# Patient Record
Sex: MALE | Race: BLACK OR AFRICAN AMERICAN
[De-identification: names, ages, dates, MRNs, and addresses within clinical notes are randomized per-mention and may not be internally consistent; named-entity substitution may affect disease eponyms.]

---

## 2022-07-01 ENCOUNTER — HOSPITAL ENCOUNTER (INPATIENT)
Dept: HOSPITAL 54 - ER | Age: 27
LOS: 1 days | Discharge: LEFT BEFORE DISCHARGE | DRG: 247 | End: 2022-07-02
Attending: FAMILY MEDICINE | Admitting: FAMILY MEDICINE
Payer: MEDICAID

## 2022-07-01 VITALS — SYSTOLIC BLOOD PRESSURE: 95 MMHG | DIASTOLIC BLOOD PRESSURE: 54 MMHG

## 2022-07-01 VITALS — BODY MASS INDEX: 22.89 KG/M2 | HEIGHT: 72 IN | WEIGHT: 169 LBS

## 2022-07-01 DIAGNOSIS — U07.1: ICD-10-CM

## 2022-07-01 DIAGNOSIS — K59.09: ICD-10-CM

## 2022-07-01 DIAGNOSIS — E87.1: ICD-10-CM

## 2022-07-01 DIAGNOSIS — E86.9: ICD-10-CM

## 2022-07-01 DIAGNOSIS — K56.609: Primary | ICD-10-CM

## 2022-07-01 DIAGNOSIS — R73.9: ICD-10-CM

## 2022-07-01 DIAGNOSIS — E87.6: ICD-10-CM

## 2022-07-01 LAB
ALBUMIN SERPL BCP-MCNC: 4.2 G/DL (ref 3.4–5)
ALP SERPL-CCNC: 100 U/L (ref 46–116)
ALT SERPL W P-5'-P-CCNC: 32 U/L (ref 12–78)
AST SERPL W P-5'-P-CCNC: 26 U/L (ref 15–37)
BASOPHILS # BLD AUTO: 0.1 K/UL (ref 0–0.2)
BASOPHILS NFR BLD AUTO: 1 % (ref 0–2)
BILIRUB DIRECT SERPL-MCNC: 0.1 MG/DL (ref 0–0.2)
BILIRUB SERPL-MCNC: 0.4 MG/DL (ref 0.2–1)
BUN SERPL-MCNC: 21 MG/DL (ref 7–18)
CALCIUM SERPL-MCNC: 10 MG/DL (ref 8.5–10.1)
CHLORIDE SERPL-SCNC: 96 MMOL/L (ref 98–107)
CO2 SERPL-SCNC: 22 MMOL/L (ref 21–32)
CREAT SERPL-MCNC: 1.1 MG/DL (ref 0.6–1.3)
EOSINOPHIL NFR BLD AUTO: 2 % (ref 0–6)
GLUCOSE SERPL-MCNC: 116 MG/DL (ref 74–106)
HCT VFR BLD AUTO: 45 % (ref 39–51)
HGB BLD-MCNC: 15.4 G/DL (ref 13.5–17.5)
LIPASE SERPL-CCNC: 154 U/L (ref 73–393)
LYMPHOCYTES NFR BLD AUTO: 2.1 K/UL (ref 0.8–4.8)
LYMPHOCYTES NFR BLD AUTO: 27.2 % (ref 20–44)
MCHC RBC AUTO-ENTMCNC: 34 G/DL (ref 31–36)
MCV RBC AUTO: 88 FL (ref 80–96)
MONOCYTES NFR BLD AUTO: 0.9 K/UL (ref 0.1–1.3)
MONOCYTES NFR BLD AUTO: 12 % (ref 2–12)
NEUTROPHILS # BLD AUTO: 4.4 K/UL (ref 1.8–8.9)
NEUTROPHILS NFR BLD AUTO: 57.8 % (ref 43–81)
PLATELET # BLD AUTO: 303 K/UL (ref 150–450)
POTASSIUM SERPL-SCNC: 3.1 MMOL/L (ref 3.5–5.1)
PROT SERPL-MCNC: 8.2 G/DL (ref 6.4–8.2)
RBC # BLD AUTO: 5.14 MIL/UL (ref 4.5–6)
SODIUM SERPL-SCNC: 132 MMOL/L (ref 136–145)
WBC NRBC COR # BLD AUTO: 7.6 K/UL (ref 4.3–11)

## 2022-07-01 PROCEDURE — A4217 STERILE WATER/SALINE, 500 ML: HCPCS

## 2022-07-01 PROCEDURE — G0378 HOSPITAL OBSERVATION PER HR: HCPCS

## 2022-07-01 RX ADMIN — POTASSIUM CHLORIDE SCH MLS/HR: 200 INJECTION, SOLUTION INTRAVENOUS at 15:57

## 2022-07-01 RX ADMIN — POTASSIUM CHLORIDE SCH MLS/HR: 200 INJECTION, SOLUTION INTRAVENOUS at 15:36

## 2022-07-01 RX ADMIN — POTASSIUM CHLORIDE SCH MLS/HR: 200 INJECTION, SOLUTION INTRAVENOUS at 13:01

## 2022-07-01 RX ADMIN — POTASSIUM CHLORIDE SCH MLS/HR: 200 INJECTION, SOLUTION INTRAVENOUS at 19:33

## 2022-07-01 RX ADMIN — POTASSIUM CHLORIDE SCH MLS/HR: 200 INJECTION, SOLUTION INTRAVENOUS at 17:33

## 2022-07-01 RX ADMIN — POTASSIUM CHLORIDE SCH MLS/HR: 200 INJECTION, SOLUTION INTRAVENOUS at 18:28

## 2022-07-01 RX ADMIN — POTASSIUM CHLORIDE SCH MLS/HR: 200 INJECTION, SOLUTION INTRAVENOUS at 13:28

## 2022-07-01 NOTE — NUR
FAZAL CORBETT  [phone]

-------------------------------------------------------------------------------

Addendum: 07/01/22 at 1253 by DIMITRY

-------------------------------------------------------------------------------

SURGERY*

## 2022-07-01 NOTE — NUR
ms rn notes 

Received endorsement  from [facility] fleet enema was not  given d/t  patient  
refuse the  medication .pts on ngt  connected to low intermittent  suction  . pts v/s stable 
afebrile  able to make  needs on room air  sating 97%pts  is alert oriented x4  able to  
make needs known , with iv  heplock on right ac g#18 intact and patent  with  ivf ns at 100 
cc/hr infusing well all needs attended too call light within  reach will continue to monitor 
pts .

## 2022-07-01 NOTE — NUR
ms rn notes

spoke to pts  explain to him the fleet enema order if he can it ,before the xray of abdomen  
he said hes willing to take in the  morning .spoke to [facility]  xray department he said they 
will do the  small bowel through in am ,

## 2022-07-02 NOTE — NUR
ms rn notes

Pts  is so upset  and wants to go AMA explain  r/b  still he wants to leave  NP jennie  made 
aware  ,supervisor  howard  made aware . pts signs AMA FORM .

## 2022-07-02 NOTE — NUR
ms rn notes 

Pts  left the  hospital via AMA at 0152 hrs  in stable  condition ambulatory. iv heplock and 
 ngt removed  ,escorted  by  security  guard .

-------------------------------------------------------------------------------

Addendum: 07/02/22 at 0147 by SIMRAN ESTES RN

-------------------------------------------------------------------------------

PTS LEFT THE  HOSPITAL AT 0052 HRS  NOT 0152HRS

## 2022-07-02 NOTE — NUR
RN NOTES

patient agitated, yelling and screaming at primary RN. Security was called; wanted to go 
AMA. Signed AMA form; notified eran Romeor supervisor but wanted MD to be called since 
patient is covid positive, Message sent to Dr. Haile.